# Patient Record
Sex: FEMALE | Race: WHITE | NOT HISPANIC OR LATINO | Employment: UNEMPLOYED | URBAN - METROPOLITAN AREA
[De-identification: names, ages, dates, MRNs, and addresses within clinical notes are randomized per-mention and may not be internally consistent; named-entity substitution may affect disease eponyms.]

---

## 2019-05-15 ENCOUNTER — CLINICAL SUPPORT (OUTPATIENT)
Dept: GENETICS | Facility: HOSPITAL | Age: 44
End: 2019-05-15

## 2019-05-15 ENCOUNTER — APPOINTMENT (OUTPATIENT)
Dept: LAB | Facility: HOSPITAL | Age: 44
End: 2019-05-15

## 2019-05-15 DIAGNOSIS — Z83.71 FAMILY HISTORY OF COLONIC POLYPS: ICD-10-CM

## 2019-05-15 DIAGNOSIS — Z13.79 GENETIC TESTING: Primary | ICD-10-CM

## 2019-05-15 DIAGNOSIS — Z80.0 FAMILY HISTORY OF COLON CANCER: ICD-10-CM

## 2019-05-15 DIAGNOSIS — Z80.3 FAMILY HISTORY OF BREAST CANCER: ICD-10-CM

## 2019-05-15 DIAGNOSIS — Z86.010 HISTORY OF COLON POLYPS: Primary | ICD-10-CM

## 2019-05-15 PROCEDURE — 96040: CPT | Performed by: GENETIC COUNSELOR, MS

## 2019-05-15 NOTE — PROGRESS NOTES
Jagdish Mora is a 44-year old female who was seen for genetic counseling due to a personal history of colon polyps and family history of cancer. Ms. Mora was 12 years old at menarche and had her first child at 17. She had a hysterectomy at 37 and retains her left ovary. Her current cancer screening consists of annual clinical breast exam, annual mammogram, and colonoscopy every year. She reports she had her first colonoscopy at 38 and five polyps were removed. She recently had her second colonoscopy and two tubular adenomas were removed. It was recommended she return in one year for her next colonoscopy.  Ms. Mora was interested in discussing her risk for a hereditary cancer syndrome, and decided to pursue genetic testing.   The CustomNext panel was ordered through [employer] which analyzes 38 genes associated with an increased cancer and polyposis risk. The genes on this panel include APC, HAMIDA, AXIN2, BARD1, BMPR1A, BRCA1, BRCA2, BRIP1, CDH1, CDK4, CDKN2A, CHEK2, DICER1, EPCAM, GALNT12, GREM1, HOXB13, MLH1, MRE11A, MSH2, MSH3, MSH6, MUTYH, NBN, NF1, NTHL1, PALB2, PMS2, POLD1, POLE, PTEN, RAD50, RAD51C, RAD51D, SMAD4, SMARCA4, STK11, and TP53. Results are expected in 2-3 weeks.    PERTINENT FAMILY HISTORY:  Mat. Half-Sister:  Breast cancer, 40  Sister:    Cervical cancer, 41  Pat. Aunt:   Breast cancer, 30s/40s  Pat. Aunt:   Breast cancer, 30s/40s  Pat. Grandmother:  Breast cancer, 70s  Mother:    colon polyps over lifetime  Mat. Aunt:   Colon cancer, early 50s  Mat. Uncle:   Colon cancer, early 50s  Mat. Aunt:   Brain cancer, 50s  Mat. Grandmother:  Colon cancer, 76; many colon polyps over lifetime  Mat. Great Grandmother: Colon cancer, 80s    Medical records regarding the diagnoses in the family were not available for review.     RISK ASSESSMENT:  Ms. Mora’s personal and family history led to concern for a hereditary cancer syndrome. She clearly meets NCCN criteria for BRCA1/2 testing based on her family  history of breast cancer in close relatives diagnosed <50. Her personal history of colon polyps and family history of colon cancer led to concern for a hereditary colorectal predisposition syndrome. We discussed the possibility of a hereditary polyposis syndrome, such as Attenuated Familial Adenomatous Polyposis (AFAP) and MYH-Associated Polyposis (MAP).   Therefore, we discussed multigene panel testing that would evaluate multiple genes simultaneously associated with polyposis and hereditary cancer. If testing were to be negative, Ms. Mora and her close family members should still be considered at an increased risk for colon polyps and managed based on this increased risk. This risk assessment is based on the family history information provided at the time of the appointment and could change in the future should new information be obtained.    GENETIC COUNSELING (30 minutes): We reviewed the family history information in detail.  Cases of cancer follow three general patterns: sporadic, familial, and hereditary.  While most cancer is sporadic, some cases appear to occur in family clusters.  These cases are said to be familial and account for 10-20% of cancer cases.  Familial cases may be due to a combination of shared genes and environmental factors among family members.  In even fewer families, the cancer is said to be inherited, and the genes responsible for the cancer are known.      Family histories typical of hereditary cancer syndromes usually include multiple first- and second-degree relatives diagnosed with cancer types that define a syndrome.  These cases tend to be diagnosed at younger-than-expected ages and can be bilateral or multifocal.  The cancer in these families follows an autosomal dominant inheritance pattern, which indicates the likely presence of a mutation in a cancer susceptibility gene.  Children and siblings of an individual believed to carry this mutation have a 50% chance of inheriting  that mutation, thereby inheriting the increased risk to develop cancer.  These mutations can be passed down from the maternal or the paternal lineage.    We discussed that hereditary breast cancer accounts for 5-10% of all cases of breast cancer.  A significant proportion of hereditary breast cancer can be attributed to mutations in the BRCA1 and BRCA2 genes.  Mutations in these genes confer an increased risk for breast cancer, ovarian cancer, male breast cancer, prostate cancer, and pancreatic cancer.  Women with a BRCA1 or BRCA2 mutation have up to an 87% lifetime risk of breast cancer and up to a 44% risk of ovarian cancer.     Given Ms. Mora’s family history of colon polyps and colon cancer, we discussed the possibility of a hereditary polyposis syndrome, including familial adenomatous polyposis (FAP) and attenuated FAP. We discussed familial adenomatous polyposis (FAP), which accounts for less than 1% of all colorectal cancers and is inherited in a dominant manner.  Typically, individuals with classic FAP have 100’s to 1000’s of colon polyps.  Attenuated FAP is a form of FAP associated with an average of 30 colon polyps and cancers diagnosed 10 years later than is typical for FAP.  FAP and AFAP are both caused by mutations in the APC gene.  FAP and AFAP are also associated with an increased risk to develop other types of cancer.  These include cancer of the duodenum (4-12%), stomach (<1%), pancreas (<1%), thyroid (<2%) and a less than 1% risk for central nervous system cancers.  Additionally, duodenal and gastric polyps are seen in individuals with AFAP.   The cumulative colon cancer risk with AFAP is estimated to be ~70% by age 80 if the adenomatous polyps are not removed. Another hereditary polyposis condition to be considered is MYH-associated polyposis (MAP). Individuals with MAP have characteristics that resemble AFAP. Unlike most hereditary cancer conditions, MAP is inherited in an autosomal recessive  manner. This means that in order to have the condition, an individual must inherit two non-working copies of the gene (mutations); one from each parent.     There are other genes that are known to be associated with hereditary polyposis and an increased risk for cancer.  Some of these genes have well defined risks and established management guidelines.  Other genes that can be tested for have been more recently described, and there may be less data regarding the risks and therefore may not have established management guidelines.  Based on Ms. Mora’s desire to get as much information as possible regarding her personal risks and potential risks for her family, she opted to pursue testing through a panel that would evaluate multiple genes that have been associated with hereditary polyposis and cancer risk.     GENETIC TESTING:  The risks, benefits and limitations of genetic testing and implications for clinical management following testing were reviewed.  DNA test results can influence decisions regarding screening, prevention and surgical management.  Genetic testing can have significant psychological implications for both individuals and families.  Also discussed was the possibility of employment and insurance discrimination based on genetic test results and the laws in place to prevent this (KAYLAH).    We discussed panel testing that would evaluate 34 genes from the [employer] CancerNext panel, as well as 4 additional genes (AXIN2, GALNT12, MSH3, NTHL1) that have been described as emerging genes of interest in colorectal cancer and polyposis risk. Therefore, the CustomNext panel was ordered to evaluate 38 genes associated with increased cancer risk. The implications of a positive or negative test result were discussed. We discussed the possibility that, in some cases, genetic test results may be informative or may be ambiguous due to the identification of a genetic variant. These variants may or may not be associated with  an increased cancer risk.  With multigene panel testing, it is not uncommon for a variant of uncertain significance (VUS) to be identified.  If a VUS is identified, testing unaffected family members is typically not recommended and screening recommendations are made based on the family history.  The laboratories that perform genetic testing work to reclassify the VUS and send out an amended report if and when a VUS is reclassified.  The majority of variant findings are ultimately reclassified to a negative result.  Given her personal and family history, a negative test result would not eliminate all risk to her relatives.      PLAN: Results should take 2-3 weeks at which time Ms. Mora will be contacted by telephone to discuss.  She is welcome to contact us in the meantime with any questions or concerns.      Jodi Gardner MS, [facility] [place], [facility]  Licensed Certified Genetic Counselor

## 2019-06-11 ENCOUNTER — DOCUMENTATION (OUTPATIENT)
Dept: GENETICS | Facility: HOSPITAL | Age: 44
End: 2019-06-11

## 2019-06-11 NOTE — PROGRESS NOTES
Jagdish Mora is a 44-year old female who was seen for genetic counseling due to a personal history of colon polyps and family history of cancer. Ms. Mora was 12 years old at menarche and had her first child at 17. She had a hysterectomy at 37 and retains her left ovary. Her current cancer screening consists of annual clinical breast exam, annual mammogram, and colonoscopy every year. She reports she had her first colonoscopy at 38 and five polyps were removed. She recently had her second colonoscopy and two tubular adenomas were removed. It was recommended she return in one year for her next colonoscopy.  Ms. Mora was interested in discussing her risk for a hereditary cancer syndrome, and decided to pursue genetic testing.   The CustomNext panel was ordered through [employer] which analyzes 38 genes associated with an increased cancer and polyposis risk. The genes on this panel include APC, HAMIDA, AXIN2, BARD1, BMPR1A, BRCA1, BRCA2, BRIP1, CDH1, CDK4, CDKN2A, CHEK2, DICER1, EPCAM, GALNT12, GREM1, HOXB13, MLH1, MRE11A, MSH2, MSH3, MSH6, MUTYH, NBN, NF1, NTHL1, PALB2, PMS2, POLD1, POLE, PTEN, RAD50, RAD51C, RAD51D, SMAD4, SMARCA4, STK11, and TP53. Genetic testing was negative for known pathogenic mutations on the CustomNext panel. While no known pathogenic mutations were identified, variants of uncertain significance were identified in the APC gene and SMARCA4 gene. Variants are changes in DNA that may or may not affect the function of the gene. The classification of a variant to either a benign gene change or deleterious mutation depends on a number of factors. The majority (estimated to be >90%) of VUS’s are eventually reclassified as benign gene changes. It is not recommended that any unaffected relatives be tested for this variant at this time, and management should not be altered based on this variant.  Management should be guided by family history assessments. These results were discussed with Ms. Mora by  telephone on 6/11/19.    PERTINENT FAMILY HISTORY:  Mat. Half-Sister:  Breast cancer, 40  Sister:    Cervical cancer, 41  Pat. Aunt:   Breast cancer, 30s/40s  Pat. Aunt:   Breast cancer, 30s/40s  Pat. Grandmother:  Breast cancer, 70s  Mother:    colon polyps over lifetime  Mat. Aunt:   Colon cancer, early 50s  Mat. Uncle:   Colon cancer, early 50s  Mat. Aunt:   Brain cancer, 50s  Mat. Grandmother:  Colon cancer, 76; many colon polyps over lifetime  Mat. Great Grandmother: Colon cancer, 80s    Medical records regarding the diagnoses in the family were not available for review.     RISK ASSESSMENT:  Ms. Mora’s personal and family history led to concern for a hereditary cancer syndrome. She clearly meets NCCN criteria for BRCA1/2 testing based on her family history of breast cancer in close relatives diagnosed <50. Her personal history of colon polyps and family history of colon cancer led to concern for a hereditary colorectal predisposition syndrome. We discussed the possibility of a hereditary polyposis syndrome, such as Attenuated Familial Adenomatous Polyposis (AFAP) and MYH-Associated Polyposis (MAP).   Therefore, we discussed multigene panel testing that would evaluate multiple genes simultaneously associated with polyposis and hereditary cancer risk. If testing were to be negative, Ms. Mora and her close family members should still be considered at an increased risk for colon polyps and managed based on this increased risk.     At this time, Ms. Mora’s management should be guided by a family history-based risk assessment. Computer modeling estimates that Ms. Mora’s lifetime risk for breast cancer is up to 16.9% ([place]), which is somewhat elevated over the general population risk of 12.5%. Per NCCN guidelines, a risk greater than 20% is considered high risk and warrants increased screening; Ms. Mora’s risk does not fall into this category.  This risk assessment is based on the information that was  provided during the session and may change if new information is obtained.    GENETIC COUNSELING: We reviewed the family history information in detail.  Cases of cancer follow three general patterns: sporadic, familial, and hereditary.  While most cancer is sporadic, some cases appear to occur in family clusters.  These cases are said to be familial and account for 10-20% of cancer cases.  Familial cases may be due to a combination of shared genes and environmental factors among family members.  In even fewer families, the cancer is said to be inherited, and the genes responsible for the cancer are known.      Family histories typical of hereditary cancer syndromes usually include multiple first- and second-degree relatives diagnosed with cancer types that define a syndrome.  These cases tend to be diagnosed at younger-than-expected ages and can be bilateral or multifocal.  The cancer in these families follows an autosomal dominant inheritance pattern, which indicates the likely presence of a mutation in a cancer susceptibility gene.  Children and siblings of an individual believed to carry this mutation have a 50% chance of inheriting that mutation, thereby inheriting the increased risk to develop cancer.  These mutations can be passed down from the maternal or the paternal lineage.    We discussed that hereditary breast cancer accounts for 5-10% of all cases of breast cancer.  A significant proportion of hereditary breast cancer can be attributed to mutations in the BRCA1 and BRCA2 genes.  Mutations in these genes confer an increased risk for breast cancer, ovarian cancer, male breast cancer, prostate cancer, and pancreatic cancer.  Women with a BRCA1 or BRCA2 mutation have up to an 87% lifetime risk of breast cancer and up to a 44% risk of ovarian cancer.     Given Ms. Mora’s family history of colon polyps and colon cancer, we discussed the possibility of a hereditary polyposis syndrome, including familial  adenomatous polyposis (FAP) and attenuated FAP. We discussed familial adenomatous polyposis (FAP), which accounts for less than 1% of all colorectal cancers and is inherited in a dominant manner.  Typically, individuals with classic FAP have 100’s to 1000’s of colon polyps.  Attenuated FAP is a form of FAP associated with an average of 30 colon polyps and cancers diagnosed 10 years later than is typical for FAP.  FAP and AFAP are both caused by mutations in the APC gene.  FAP and AFAP are also associated with an increased risk to develop other types of cancer.  These include cancer of the duodenum (4-12%), stomach (<1%), pancreas (<1%), thyroid (<2%) and a less than 1% risk for central nervous system cancers.  Additionally, duodenal and gastric polyps are seen in individuals with AFAP.   The cumulative colon cancer risk with AFAP is estimated to be ~70% by age 80 if the adenomatous polyps are not removed. Another hereditary polyposis condition to be considered is MYH-associated polyposis (MAP). Individuals with MAP have characteristics that resemble AFAP. Unlike most hereditary cancer conditions, MAP is inherited in an autosomal recessive manner. This means that in order to have the condition, an individual must inherit two non-working copies of the gene (mutations); one from each parent.     There are other genes that are known to be associated with hereditary polyposis and an increased risk for cancer.  Some of these genes have well defined risks and established management guidelines.  Other genes that can be tested for have been more recently described, and there may be less data regarding the risks and therefore may not have established management guidelines.  Based on Ms. Mora’s desire to get as much information as possible regarding her personal risks and potential risks for her family, she opted to pursue testing through a panel that would evaluate multiple genes that have been associated with hereditary  polyposis and cancer risk.     GENETIC TESTING:  The risks, benefits and limitations of genetic testing and implications for clinical management following testing were reviewed.  DNA test results can influence decisions regarding screening, prevention and surgical management.  Genetic testing can have significant psychological implications for both individuals and families.  Also discussed was the possibility of employment and insurance discrimination based on genetic test results and the laws in place to prevent this (KAYLAH).    We discussed panel testing that would evaluate 34 genes from the [employer] CancerNext panel, as well as 4 additional genes (AXIN2, GALNT12, MSH3, NTHL1) that have been described as emerging genes of interest in colorectal cancer and polyposis risk. Therefore, the CustomNext panel was ordered to evaluate 38 genes associated with increased cancer risk. The implications of a positive or negative test result were discussed. We discussed the possibility that, in some cases, genetic test results may be informative or may be ambiguous due to the identification of a genetic variant. These variants may or may not be associated with an increased cancer risk.  With multigene panel testing, it is not uncommon for a variant of uncertain significance (VUS) to be identified.  If a VUS is identified, testing unaffected family members is typically not recommended and screening recommendations are made based on the family history.  The laboratories that perform genetic testing work to reclassify the VUS and send out an amended report if and when a VUS is reclassified.  The majority of variant findings are ultimately reclassified to a negative result.  Given her personal and family history, a negative test result would not eliminate all risk to her relatives.      TEST RESULTS:  Genetic testing was negative for known pathogenic mutations by sequencing and rearrangement testing for the genes on this multi-gene  panel.  Variants of uncertain significance (VUS) were identified in the APC gene and SMARCA4 gene, however the majority of VUS’s are ultimately reclassified as benign, therefore this result should not be used in making management decisions. If this variant is ever reclassified a new report will be issued by the laboratory and released directly to the ordering physician, and our office. We are unable to determine why Ms. Mora’s relatives have developed cancer at this time. It is possible that there is a mutation present in the family that Ms. Mora did not happen to inherit. If a relative of Ms. Mora were to have testing and was found to carry a pathogenic mutation in a gene included on this panel, Ms. Mora’s risk assessment would need to be updated. This assessment is based on the information provided at the time of the consultation.     CANCER PREVENTION:  Options available to individuals with an elevated lifetime risk for breast and/or ovarian cancer were briefly discussed, including increased surveillance, chemoprevention and prophylactic surgery (mastectomy and/or oophorectomy).  Based on computer modeling, Ms. Mora’s lifetime risk for breast cancer would not be considered “high risk”.  She should continue to follow general population screening guidelines for her breast cancer risk, including annual clinical breast exam, annual mammography, and self-breast exam.      At this time, Ms. Mora’s management and surveillance should be determined by her physician based on her personal and family history of colon polyps.  Per NCCN guidelines, if an individual has a family history of >20-<100 adenomas (pre-cancerous polyps) in a first-degree relative, they should consider colonoscopy and polypectomy every 3-5 years starting at the same age as the youngest diagnosis of polyposis in the family in uncomplicated by cancer or by age 40, whichever is earliest. This may be done more frequently based on personal clinical  findings. Ms. Mora’s children should also undergo screening colonoscopy based on the family history. These assessments are based on the information provided at the time of consultation and could change should new information become available.     PLAN: Genetic counseling remains available to Ms. Mora and her family. She is welcome to contact us with any questions or concerns at [phone].      Jodi Gardner MS, [facility] [place], [facility]  Licensed Certified Genetic Counselor       Cc: MD Jagdish Steinberg

## 2022-11-23 RX ORDER — HYDROCORTISONE 25 MG/G
CREAM TOPICAL
Qty: 30 EACH | Refills: 5 | OUTPATIENT
Start: 2022-11-23

## 2025-04-08 ENCOUNTER — TRANSCRIBE ORDERS (OUTPATIENT)
Dept: ADMINISTRATIVE | Facility: HOSPITAL | Age: 50
End: 2025-04-08
Payer: COMMERCIAL

## 2025-04-08 DIAGNOSIS — D24.1 BENIGN NEOPLASM OF RIGHT BREAST: Primary | ICD-10-CM

## 2025-04-30 ENCOUNTER — HOSPITAL ENCOUNTER (OUTPATIENT)
Dept: MAMMOGRAPHY | Facility: HOSPITAL | Age: 50
Discharge: HOME | End: 2025-04-30
Payer: COMMERCIAL

## 2025-04-30 DIAGNOSIS — D24.1 BENIGN NEOPLASM OF RIGHT BREAST: ICD-10-CM

## 2025-04-30 PROCEDURE — 77065 DX MAMMO INCL CAD UNI: CPT | Performed by: RADIOLOGY

## 2025-04-30 PROCEDURE — 25010000002 LIDOCAINE 1 % SOLUTION: Performed by: RADIOLOGY

## 2025-04-30 PROCEDURE — A4648 IMPLANTABLE TISSUE MARKER: HCPCS

## 2025-04-30 PROCEDURE — 19283 PERQ DEV BREAST 1ST STRTCTC: CPT | Performed by: RADIOLOGY

## 2025-04-30 RX ORDER — LIDOCAINE HYDROCHLORIDE 10 MG/ML
5 INJECTION, SOLUTION INFILTRATION; PERINEURAL ONCE
Status: COMPLETED | OUTPATIENT
Start: 2025-04-30 | End: 2025-04-30

## 2025-04-30 RX ADMIN — Medication 5 ML: at 08:42

## 2025-05-06 ENCOUNTER — LAB REQUISITION (OUTPATIENT)
Dept: LAB | Facility: HOSPITAL | Age: 50
End: 2025-05-06
Payer: COMMERCIAL

## 2025-05-06 ENCOUNTER — HOSPITAL ENCOUNTER (OUTPATIENT)
Dept: MAMMOGRAPHY | Facility: HOSPITAL | Age: 50
Discharge: HOME | End: 2025-05-06
Admitting: SURGERY
Payer: COMMERCIAL

## 2025-05-06 DIAGNOSIS — D24.1 BENIGN NEOPLASM OF RIGHT BREAST: ICD-10-CM

## 2025-05-06 PROCEDURE — 76098 X-RAY EXAM SURGICAL SPECIMEN: CPT

## 2025-05-06 PROCEDURE — 76098 X-RAY EXAM SURGICAL SPECIMEN: CPT | Performed by: RADIOLOGY

## 2025-05-06 PROCEDURE — 88307 TISSUE EXAM BY PATHOLOGIST: CPT | Performed by: SURGERY

## 2025-05-08 LAB
CYTO UR: NORMAL
LAB AP CASE REPORT: NORMAL
LAB AP CLINICAL INFORMATION: NORMAL
PATH REPORT.FINAL DX SPEC: NORMAL
PATH REPORT.GROSS SPEC: NORMAL